# Patient Record
Sex: MALE | URBAN - METROPOLITAN AREA
[De-identification: names, ages, dates, MRNs, and addresses within clinical notes are randomized per-mention and may not be internally consistent; named-entity substitution may affect disease eponyms.]

---

## 2021-10-05 ENCOUNTER — OFFICE VISIT (OUTPATIENT)
Dept: URBAN - METROPOLITAN AREA CLINIC 13 | Facility: CLINIC | Age: 66
End: 2021-10-05
Payer: MEDICARE

## 2021-10-05 DIAGNOSIS — H17.9 CORNEAL SCAR: ICD-10-CM

## 2021-10-05 DIAGNOSIS — H33.8 OTHER RETINAL DETACHMENTS: ICD-10-CM

## 2021-10-05 PROCEDURE — 99204 OFFICE O/P NEW MOD 45 MIN: CPT | Performed by: OPHTHALMOLOGY

## 2021-10-05 PROCEDURE — 76512 OPH US DX B-SCAN: CPT | Performed by: OPHTHALMOLOGY

## 2021-10-05 PROCEDURE — 92134 CPTRZ OPH DX IMG PST SGM RTA: CPT | Performed by: OPHTHALMOLOGY

## 2021-10-05 ASSESSMENT — INTRAOCULAR PRESSURE
OS: 16
OD: 15

## 2021-10-05 NOTE — IMPRESSION/PLAN
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
-s/p multiple sx

B-scan: retina attached; SB indentation 360 degrees Plan: Pt had sx 2003. Stable exam. Longstanding poor vision. Comfort care.

## 2021-10-05 NOTE — IMPRESSION/PLAN
Impression: Degenerative myopia, bilateral: H44.23. OCT:
OD: mild posterior staphyloma Plan: The fundi appearance is consistent with myopic degeneration. The patient has a high degree of myopia. The retina is attached with no evidence of peripheral retinal breaks. An OCT today confirmed normal foveal architecture without subretinal fluid or CME. We will continue to observe without treatment at this time. Signs and symptoms of retinal detachment discussed and patient advised to call if any new symptoms. 

RTC 1 year DFE OU OCT OU

## 2021-10-05 NOTE — IMPRESSION/PLAN
Impression: Age-related nuclear cataract, right eye: H25.11. Plan: Cleared for cat sx from retina perspective.

## 2022-04-05 ENCOUNTER — OFFICE VISIT (OUTPATIENT)
Dept: URBAN - METROPOLITAN AREA CLINIC 13 | Facility: CLINIC | Age: 67
End: 2022-04-05
Payer: MEDICARE

## 2022-04-05 DIAGNOSIS — H44.23 DEGENERATIVE MYOPIA, BILATERAL: Primary | ICD-10-CM

## 2022-04-05 DIAGNOSIS — H25.11 AGE-RELATED NUCLEAR CATARACT, RIGHT EYE: ICD-10-CM

## 2022-04-05 PROCEDURE — 76512 OPH US DX B-SCAN: CPT | Performed by: OPHTHALMOLOGY

## 2022-04-05 PROCEDURE — 99213 OFFICE O/P EST LOW 20 MIN: CPT | Performed by: OPHTHALMOLOGY

## 2022-04-05 PROCEDURE — 92134 CPTRZ OPH DX IMG PST SGM RTA: CPT | Performed by: OPHTHALMOLOGY

## 2022-04-05 ASSESSMENT — INTRAOCULAR PRESSURE
OS: 15
OD: 11

## 2023-02-23 ENCOUNTER — OFFICE VISIT (OUTPATIENT)
Dept: URBAN - METROPOLITAN AREA CLINIC 13 | Facility: CLINIC | Age: 68
End: 2023-02-23
Payer: MEDICARE

## 2023-02-23 DIAGNOSIS — H44.23 DEGENERATIVE MYOPIA, BILATERAL: Primary | ICD-10-CM

## 2023-02-23 DIAGNOSIS — H25.11 AGE-RELATED NUCLEAR CATARACT, RIGHT EYE: ICD-10-CM

## 2023-02-23 DIAGNOSIS — H33.8 OTHER RETINAL DETACHMENTS: ICD-10-CM

## 2023-02-23 DIAGNOSIS — H17.9 CORNEAL SCAR: ICD-10-CM

## 2023-02-23 PROCEDURE — 92134 CPTRZ OPH DX IMG PST SGM RTA: CPT | Performed by: STUDENT IN AN ORGANIZED HEALTH CARE EDUCATION/TRAINING PROGRAM

## 2023-02-23 PROCEDURE — 76512 OPH US DX B-SCAN: CPT | Performed by: STUDENT IN AN ORGANIZED HEALTH CARE EDUCATION/TRAINING PROGRAM

## 2023-02-23 PROCEDURE — 99214 OFFICE O/P EST MOD 30 MIN: CPT | Performed by: STUDENT IN AN ORGANIZED HEALTH CARE EDUCATION/TRAINING PROGRAM

## 2023-02-23 ASSESSMENT — INTRAOCULAR PRESSURE
OS: 5
OD: 6

## 2023-02-23 NOTE — IMPRESSION/PLAN
Impression: Degenerative myopia, bilateral: H44.23. OCT:
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 The patient has a high degree of myopia. The retina is attached with no evidence of peripheral retinal breaks. An OCT today confirmed normal foveal architecture without subretinal fluid or CME. We will continue to observe without treatment at this time. Signs and symptoms of retinal detachment discussed and patient advised to call if any new symptoms. 

RTC: 6 months DFE OU / OCT OU / B-scan OS

## 2023-02-23 NOTE — IMPRESSION/PLAN
Impression: Corneal scar: H17.9. Plan: Band K. Pt interested in cosmetic improvement. Patient may return back to cornea.

## 2023-07-21 ENCOUNTER — OFFICE VISIT (OUTPATIENT)
Dept: URBAN - METROPOLITAN AREA CLINIC 13 | Facility: CLINIC | Age: 68
End: 2023-07-21
Payer: MEDICARE

## 2023-07-21 DIAGNOSIS — H33.8 OTHER RETINAL DETACHMENTS: ICD-10-CM

## 2023-07-21 DIAGNOSIS — H17.9 CORNEAL SCAR: ICD-10-CM

## 2023-07-21 DIAGNOSIS — H44.23 DEGENERATIVE MYOPIA, BILATERAL: Primary | ICD-10-CM

## 2023-07-21 PROCEDURE — 92134 CPTRZ OPH DX IMG PST SGM RTA: CPT | Performed by: STUDENT IN AN ORGANIZED HEALTH CARE EDUCATION/TRAINING PROGRAM

## 2023-07-21 PROCEDURE — 99214 OFFICE O/P EST MOD 30 MIN: CPT | Performed by: STUDENT IN AN ORGANIZED HEALTH CARE EDUCATION/TRAINING PROGRAM

## 2023-07-21 PROCEDURE — 76512 OPH US DX B-SCAN: CPT | Performed by: STUDENT IN AN ORGANIZED HEALTH CARE EDUCATION/TRAINING PROGRAM

## 2023-07-21 ASSESSMENT — INTRAOCULAR PRESSURE: OD: 15

## 2023-07-21 NOTE — IMPRESSION/PLAN
Impression: Degenerative myopia, bilateral: H44.23. OCT:
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 The patient has a high degree of myopia. The retina is attached with no evidence of peripheral retinal breaks. An OCT today confirmed normal foveal architecture without subretinal fluid or CME. We will continue to observe without treatment at this time. Signs and symptoms of retinal detachment discussed and patient advised to call if any new symptoms. 

RTC: 6 months DFE OCT OU / Bscan OS

## 2024-01-26 ENCOUNTER — OFFICE VISIT (OUTPATIENT)
Dept: URBAN - METROPOLITAN AREA CLINIC 13 | Facility: CLINIC | Age: 69
End: 2024-01-26
Payer: MEDICARE

## 2024-01-26 DIAGNOSIS — H44.23 DEGENERATIVE MYOPIA, BILATERAL: Primary | ICD-10-CM

## 2024-01-26 DIAGNOSIS — H33.8 OTHER RETINAL DETACHMENTS: ICD-10-CM

## 2024-01-26 DIAGNOSIS — H17.9 CORNEAL SCAR: ICD-10-CM

## 2024-01-26 PROCEDURE — 76512 OPH US DX B-SCAN: CPT | Performed by: STUDENT IN AN ORGANIZED HEALTH CARE EDUCATION/TRAINING PROGRAM

## 2024-01-26 PROCEDURE — 92134 CPTRZ OPH DX IMG PST SGM RTA: CPT | Performed by: STUDENT IN AN ORGANIZED HEALTH CARE EDUCATION/TRAINING PROGRAM

## 2024-01-26 PROCEDURE — 99214 OFFICE O/P EST MOD 30 MIN: CPT | Performed by: STUDENT IN AN ORGANIZED HEALTH CARE EDUCATION/TRAINING PROGRAM

## 2024-01-26 ASSESSMENT — INTRAOCULAR PRESSURE
OS: 8
OD: 13

## 2024-06-12 ENCOUNTER — OFFICE VISIT (OUTPATIENT)
Dept: URBAN - METROPOLITAN AREA CLINIC 10 | Facility: CLINIC | Age: 69
End: 2024-06-12
Payer: MEDICARE

## 2024-06-12 DIAGNOSIS — H33.8 OTHER RETINAL DETACHMENTS: ICD-10-CM

## 2024-06-12 DIAGNOSIS — H17.12 CENTRAL CORNEAL OPACITY, LEFT EYE: ICD-10-CM

## 2024-06-12 DIAGNOSIS — H18.422 BAND KERATOPATHY, LEFT EYE: Primary | ICD-10-CM

## 2024-06-12 PROCEDURE — 92025 CPTRIZED CORNEAL TOPOGRAPHY: CPT | Performed by: OPHTHALMOLOGY

## 2024-06-12 PROCEDURE — 99204 OFFICE O/P NEW MOD 45 MIN: CPT | Performed by: OPHTHALMOLOGY

## 2024-06-12 PROCEDURE — 76514 ECHO EXAM OF EYE THICKNESS: CPT | Performed by: OPHTHALMOLOGY

## 2024-06-12 ASSESSMENT — VISUAL ACUITY: OD: 20/25

## 2024-06-12 ASSESSMENT — INTRAOCULAR PRESSURE
OS: 11
OD: 11

## 2024-07-26 ENCOUNTER — OFFICE VISIT (OUTPATIENT)
Dept: URBAN - METROPOLITAN AREA CLINIC 13 | Facility: CLINIC | Age: 69
End: 2024-07-26
Payer: MEDICARE

## 2024-07-26 DIAGNOSIS — H33.8 OTHER RETINAL DETACHMENTS: ICD-10-CM

## 2024-07-26 DIAGNOSIS — H17.12 CENTRAL CORNEAL OPACITY, LEFT EYE: ICD-10-CM

## 2024-07-26 DIAGNOSIS — H44.23 DEGENERATIVE MYOPIA, BILATERAL: Primary | ICD-10-CM

## 2024-07-26 DIAGNOSIS — H17.9 CORNEAL SCAR: ICD-10-CM

## 2024-07-26 PROCEDURE — 76512 OPH US DX B-SCAN: CPT | Performed by: STUDENT IN AN ORGANIZED HEALTH CARE EDUCATION/TRAINING PROGRAM

## 2024-07-26 PROCEDURE — 92134 CPTRZ OPH DX IMG PST SGM RTA: CPT | Performed by: STUDENT IN AN ORGANIZED HEALTH CARE EDUCATION/TRAINING PROGRAM

## 2024-07-26 PROCEDURE — 99214 OFFICE O/P EST MOD 30 MIN: CPT | Performed by: STUDENT IN AN ORGANIZED HEALTH CARE EDUCATION/TRAINING PROGRAM

## 2024-07-26 ASSESSMENT — INTRAOCULAR PRESSURE: OD: 13
